# Patient Record
Sex: MALE | Race: WHITE | Employment: OTHER | URBAN - METROPOLITAN AREA
[De-identification: names, ages, dates, MRNs, and addresses within clinical notes are randomized per-mention and may not be internally consistent; named-entity substitution may affect disease eponyms.]

---

## 2024-05-31 ENCOUNTER — HOSPITAL ENCOUNTER (EMERGENCY)
Facility: HOSPITAL | Age: 74
Discharge: HOME | End: 2024-05-31
Attending: EMERGENCY MEDICINE
Payer: MEDICARE

## 2024-05-31 VITALS
OXYGEN SATURATION: 97 % | TEMPERATURE: 97 F | SYSTOLIC BLOOD PRESSURE: 157 MMHG | RESPIRATION RATE: 20 BRPM | DIASTOLIC BLOOD PRESSURE: 78 MMHG | BODY MASS INDEX: 28.17 KG/M2 | WEIGHT: 208 LBS | HEART RATE: 64 BPM | HEIGHT: 72 IN

## 2024-05-31 DIAGNOSIS — N23 RENAL COLIC: Primary | ICD-10-CM

## 2024-05-31 LAB
ALBUMIN SERPL-MCNC: 4.5 G/DL (ref 3.2–4.8)
ALBUMIN/GLOB SERPL: 1.7 {RATIO} (ref 1–2)
ALP LIVER SERPL-CCNC: 46 U/L
ALT SERPL-CCNC: 25 U/L
ANION GAP SERPL CALC-SCNC: 6 MMOL/L (ref 0–18)
AST SERPL-CCNC: 23 U/L (ref ?–34)
BASOPHILS # BLD AUTO: 0.06 X10(3) UL (ref 0–0.2)
BASOPHILS NFR BLD AUTO: 0.5 %
BILIRUB SERPL-MCNC: 0.8 MG/DL (ref 0.2–1.1)
BILIRUB UR QL: NEGATIVE
BUN BLD-MCNC: 17 MG/DL (ref 9–23)
BUN/CREAT SERPL: 16.5 (ref 10–20)
CALCIUM BLD-MCNC: 9.3 MG/DL (ref 8.7–10.4)
CHLORIDE SERPL-SCNC: 107 MMOL/L (ref 98–112)
CLARITY UR: CLEAR
CO2 SERPL-SCNC: 25 MMOL/L (ref 21–32)
COLOR UR: YELLOW
CREAT BLD-MCNC: 1.03 MG/DL
DEPRECATED RDW RBC AUTO: 48.6 FL (ref 35.1–46.3)
EGFRCR SERPLBLD CKD-EPI 2021: 76 ML/MIN/1.73M2 (ref 60–?)
EOSINOPHIL # BLD AUTO: 0.32 X10(3) UL (ref 0–0.7)
EOSINOPHIL NFR BLD AUTO: 2.6 %
ERYTHROCYTE [DISTWIDTH] IN BLOOD BY AUTOMATED COUNT: 14.4 % (ref 11–15)
GLOBULIN PLAS-MCNC: 2.7 G/DL (ref 2–3.5)
GLUCOSE BLD-MCNC: 124 MG/DL (ref 70–99)
GLUCOSE UR-MCNC: NORMAL MG/DL
HCT VFR BLD AUTO: 39.7 %
HGB BLD-MCNC: 14.3 G/DL
HYALINE CASTS #/AREA URNS AUTO: PRESENT /LPF
IMM GRANULOCYTES # BLD AUTO: 0.04 X10(3) UL (ref 0–1)
IMM GRANULOCYTES NFR BLD: 0.3 %
KETONES UR-MCNC: NEGATIVE MG/DL
LEUKOCYTE ESTERASE UR QL STRIP.AUTO: 250
LYMPHOCYTES # BLD AUTO: 0.92 X10(3) UL (ref 1–4)
LYMPHOCYTES NFR BLD AUTO: 7.3 %
MCH RBC QN AUTO: 33.3 PG (ref 26–34)
MCHC RBC AUTO-ENTMCNC: 36 G/DL (ref 31–37)
MCV RBC AUTO: 92.3 FL
MONOCYTES # BLD AUTO: 0.72 X10(3) UL (ref 0.1–1)
MONOCYTES NFR BLD AUTO: 5.7 %
NEUTROPHILS # BLD AUTO: 10.47 X10 (3) UL (ref 1.5–7.7)
NEUTROPHILS # BLD AUTO: 10.47 X10(3) UL (ref 1.5–7.7)
NEUTROPHILS NFR BLD AUTO: 83.6 %
NITRITE UR QL STRIP.AUTO: NEGATIVE
OSMOLALITY SERPL CALC.SUM OF ELEC: 289 MOSM/KG (ref 275–295)
PH UR: 5 [PH] (ref 5–8)
PLATELET # BLD AUTO: 239 10(3)UL (ref 150–450)
POTASSIUM SERPL-SCNC: 4.2 MMOL/L (ref 3.5–5.1)
PROT SERPL-MCNC: 7.2 G/DL (ref 5.7–8.2)
RBC # BLD AUTO: 4.3 X10(6)UL
RBC #/AREA URNS AUTO: >10 /HPF
SODIUM SERPL-SCNC: 138 MMOL/L (ref 136–145)
SP GR UR STRIP: 1.02 (ref 1–1.03)
UROBILINOGEN UR STRIP-ACNC: NORMAL
WBC # BLD AUTO: 12.5 X10(3) UL (ref 4–11)

## 2024-05-31 PROCEDURE — 96374 THER/PROPH/DIAG INJ IV PUSH: CPT

## 2024-05-31 PROCEDURE — 99285 EMERGENCY DEPT VISIT HI MDM: CPT

## 2024-05-31 PROCEDURE — 80053 COMPREHEN METABOLIC PANEL: CPT | Performed by: EMERGENCY MEDICINE

## 2024-05-31 PROCEDURE — 85025 COMPLETE CBC W/AUTO DIFF WBC: CPT | Performed by: EMERGENCY MEDICINE

## 2024-05-31 PROCEDURE — 96375 TX/PRO/DX INJ NEW DRUG ADDON: CPT

## 2024-05-31 PROCEDURE — 99284 EMERGENCY DEPT VISIT MOD MDM: CPT

## 2024-05-31 PROCEDURE — 87086 URINE CULTURE/COLONY COUNT: CPT | Performed by: EMERGENCY MEDICINE

## 2024-05-31 PROCEDURE — 81001 URINALYSIS AUTO W/SCOPE: CPT | Performed by: EMERGENCY MEDICINE

## 2024-05-31 RX ORDER — METOPROLOL SUCCINATE 50 MG/1
50 TABLET, EXTENDED RELEASE ORAL DAILY
COMMUNITY

## 2024-05-31 RX ORDER — LOSARTAN POTASSIUM 50 MG/1
50 TABLET ORAL DAILY
COMMUNITY

## 2024-05-31 RX ORDER — KETOROLAC TROMETHAMINE 15 MG/ML
15 INJECTION, SOLUTION INTRAMUSCULAR; INTRAVENOUS ONCE
Status: COMPLETED | OUTPATIENT
Start: 2024-05-31 | End: 2024-05-31

## 2024-05-31 RX ORDER — ONDANSETRON 2 MG/ML
4 INJECTION INTRAMUSCULAR; INTRAVENOUS ONCE
Status: COMPLETED | OUTPATIENT
Start: 2024-05-31 | End: 2024-05-31

## 2024-05-31 RX ORDER — ONDANSETRON 4 MG/1
4 TABLET, ORALLY DISINTEGRATING ORAL EVERY 4 HOURS PRN
Qty: 10 TABLET | Refills: 0 | Status: SHIPPED | OUTPATIENT
Start: 2024-05-31 | End: 2024-06-07

## 2024-05-31 RX ORDER — ROSUVASTATIN CALCIUM 10 MG/1
10 TABLET, COATED ORAL NIGHTLY
COMMUNITY

## 2024-05-31 RX ORDER — KETOROLAC TROMETHAMINE 10 MG/1
10 TABLET, FILM COATED ORAL EVERY 6 HOURS PRN
Qty: 14 TABLET | Refills: 0 | Status: SHIPPED | OUTPATIENT
Start: 2024-05-31 | End: 2024-06-07

## 2024-05-31 NOTE — ED PROVIDER NOTES
Patient Seen in: [facility] Emergency Department      History     Chief Complaint   Patient presents with    Abdomen/Flank Pain    Urinary Symptoms    Nausea/Vomiting/Diarrhea     Stated Complaint: rt flank pain, difficulty urinating, nausea    Subjective:   HPI      Objective:   Past Medical History:    Arrhythmia    Essential hypertension    Hyperlipidemia              Past Surgical History:   Procedure Laterality Date    Cataract      Tonsillectomy                  Social History     Socioeconomic History    Marital status:    Tobacco Use    Smoking status: Never    Smokeless tobacco: Never   Vaping Use    Vaping status: Never Used   Substance and Sexual Activity    Alcohol use: Yes     Alcohol/week: 2.0 standard drinks of alcohol     Types: 2 Glasses of wine per week    Drug use: Never              Review of Systems    Positive for stated complaint: rt flank pain, difficulty urinating, nausea  Other systems are as noted in HPI.  Constitutional and vital signs reviewed.      All other systems reviewed and negative except as noted above.    Physical Exam     ED Triage Vitals [05/31/24 0852]   /78   Pulse 64   Resp 20   Temp 97.4 °F (36.3 °C)   Temp src Temporal   SpO2 97 %   O2 Device None (Room air)       Current Vitals:   Vital Signs  BP: 157/78  Pulse: 64  Resp: 20  Temp: 97.4 °F (36.3 °C)  Temp src: Temporal    Oxygen Therapy  SpO2: 97 %  O2 Device: None (Room air)            Physical Exam          ED Course     Labs Reviewed   URINALYSIS WITH CULTURE REFLEX - Abnormal; Notable for the following components:       Result Value    Blood Urine 2+ (*)     Protein Urine Trace (*)     Leukocyte Esterase Urine 250 (*)     WBC Urine 21-50 (*)     RBC Urine >10 (*)     Bacteria Urine Rare (*)     Squamous Epi. Cells Few (*)     Hyaline Casts Present (*)     All other components within normal limits   COMP METABOLIC PANEL (14) - Abnormal; Notable for the following components:    Glucose 124 (*)      All other components within normal limits   CBC W/ DIFFERENTIAL - Abnormal; Notable for the following components:    WBC 12.5 (*)     RDW-SD 48.6 (*)     Neutrophil Absolute Prelim 10.47 (*)     Neutrophil Absolute 10.47 (*)     Lymphocyte Absolute 0.92 (*)     All other components within normal limits   CBC WITH DIFFERENTIAL WITH PLATELET    Narrative:     The following orders were created for panel order CBC With Differential With Platelet.  Procedure                               Abnormality         Status                     ---------                               -----------         ------                     CBC W/ DIFFERENTIAL[[id number]]          Abnormal            Final result                 Please view results for these tests on the individual orders.   RAINBOW DRAW LAVENDER   RAINBOW DRAW LIGHT GREEN   RAINBOW DRAW BLUE   RAINBOW DRAW GOLD   URINE CULTURE, ROUTINE                      MDM      73-year-old male with a history of hypertension, hyperlipidemia, and kidney stones presents today with flank pain.  Patient states that early this morning, he was woken up by sudden onset of right flank pain associated with some nausea/vomiting and diaphoresis.  Symptoms initially severe but have significantly improved by arrival in the ER.  Denies dysuria, fevers, diarrhea, other abdominal pain, or other symptoms at this time.    On exam, vitals normal, well-appearing, mild right CVA tenderness with mild right lower quadrant tenderness without distention or guarding    Differential: Renal colic, muscle spasm, considered but less likely diverticulitis    I performed and interpreted bedside ultrasound of the right kidney which showed no renal stones or hydronephrosis.  Bladder ultrasound showed prostatic hypertrophy but no obstruction.    Labs show mild leukocytosis which is likely reactive.  Urinalysis with gross blood.    Patient given Toradol and Zofran in the ER and a prescription for Toradol and Zofran for  home.  Instructed to follow-up with his urologist and strain urine.  Agrees to careful return precautions.                                   MDM    Disposition and Plan     Clinical Impression:  1. Renal colic         Disposition:  Discharge  5/31/2024  9:39 am    Follow-up:  Your urologist    Follow up in 1 week(s)      We recommend that you schedule follow up care with a primary care provider within the next three months to obtain basic health screening including reassessment of your blood pressure.      Medications Prescribed:  Discharge Medication List as of 5/31/2024  9:53 AM        START taking these medications    Details   ondansetron 4 MG Oral Tablet Dispersible Take 1 tablet (4 mg total) by mouth every 4 (four) hours as needed for Nausea., Print, Disp-10 tablet, R-0      Ketorolac Tromethamine 10 MG Oral Tab Take 1 tablet (10 mg total) by mouth every 6 (six) hours as needed for Pain., Print, Disp-14 tablet, R-0

## 2024-05-31 NOTE — ED INITIAL ASSESSMENT (HPI)
PT states rt flank pain, nausea, diaphoresis and trouble urinating started about 0600 has h/o kidney stones.